# Patient Record
(demographics unavailable — no encounter records)

---

## 2024-12-27 NOTE — ASSESSMENT
[FreeTextEntry1] : Ms. VILLANUEVA is a 68-year-old female status post left craniotomy for resection of high-grade glioma with 5-ALA on 12/13/2024 presenting today for a post-operative visit.  Neurologically doing well, neurologically intact.  Incision well-healed.   Pending finalization of pathology.  Radiation/Oncology consultation today, 12/27/2024 with Dr. Gonzalez.   Patient will then follow-up with medical oncologist, Dr. Laguerre in the upcoming week and will go for second opinion at [facility] as well.  Repeat MRI Brain w/wo gado in 3 months.    Nguyen Ordaz MS FNP-BC Nurse Practitioner [facility]  Elias Eldridge MD  Department of Neurosurgery [facility] / [facility]

## 2024-12-27 NOTE — REASON FOR VISIT
[Family Member] : family member [de-identified] : Left craniotomy for resection tumor with 5-ALA  [de-identified] : 12/13/24

## 2024-12-27 NOTE — HISTORY OF PRESENT ILLNESS
[FreeTextEntry1] : Ms. VILLANUEVA is a 68-year-old female status post left craniotomy for resection of high-grade glioma with 5-ALA on 12/13/2024 presenting today for a post-operative visit.   INCISION: Well-healed. Sutures removed. No visible drainage, erythema, warmth, or edema. No signs or symptoms of infection noted.  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 Final classification pending results of special studies, including additional immunohistochemistry, molecular studies (NGS panel) and MGMT promoter methylation assay.    Physical Exam: Constitutional: Well appearing, no distress HEENT: Normocephalic Atraumatic Psychiatric: Awake, alert, oriented to person, place, situation and time. Normal affect. Follows commands. Language: Speech is clear, fluent with good repetition & comprehension. No dysarthria. Pulmonary: No respiratory distress  Neurologic: CN II-XII intact; EOMI with no nystagmus. No facial asymmetry bilaterally, full eye closure strength bilaterally. Hearing grossly normal. Head turning & shoulder shrug intact, bilaterally. Tongue midline, normal movements, no atrophy. Smile symmetrical. Palpation: No pain to palpation Strength: Full strength in all major muscle groups Sensation: Full sensation to light touch in all extremities, V1-V3 sensation bilaterally intact. Motor: No pronator drift, muscle strength of bilateral UE and bilateral LE: 5/5. Normal muscle tone. ROM intact Gait: No postural instability. Normal stance and walking without assistance.

## 2025-02-07 NOTE — HISTORY OF PRESENT ILLNESS
[FreeTextEntry1] :  This telephonic visit was provided via audio only technology. The patient, Cristina Dan, was located at home at the time of the visit. The provider, Elias Eldridge, was located at the medical office located in [address] [place] [address], [address] [place] [address] at the time of the visit. The patient, Cristina Dan and Provider participated in the telephonic visit. Verbal consent for telephonic services was given on 2/7/25 by the patient, Cristina Dan.  Overall Ms. Dan is doing excellent.  She reports no new headaches, nausea, vomiting, chest pain, shortness of breath, new weakness, numbness, tingling radiating down her arms or legs.  She has establish care with [facility] for her chemo and radiation with Dr. Yanez as her neuro oncologist and Dr. Ramos as her radiation oncologist.  She is on the third of 6 weeks of the Stupp protocol and is considering Optune Tumor Treating Fields therapy after her chemoradiation is completed.  She will follow-up with me in 2 months time with repeat brain MRI with and without contrast or sooner should she develop any new signs or symptoms of neurologic dysfunction.  All the patient's questions were answered and she was in full agreement with the plan above.  Thank you for allowing me to participate in your care.  Sincerely,  Elias Eldridge MD  Department of Neurosurgery [facility]

## 2025-02-07 NOTE — ASSESSMENT
[FreeTextEntry1] : Ms. VILLANUEVA is a very pleasant 68-year-old female status post left craniotomy for resection of high-grade glioma with 5-ALA on 12/13/2024 presenting today for telephonic post-operative visit.  Overall the patient is doing excellent and is on 3 out of 6 weeks of her subsequent chemoradiation and considering Optune Tumor Treating Fields thereafter.  She is getting treated by Lilian Yanez and Rachel for [facility] and will follow-up with me in 2 months time with repeat brain MRI with and without contrast or sooner should she develop any new signs or symptoms of neurologic dysfunction.  All the patient questions were answered and she was in full agreement the plan above.  Thank you for allowing me to participate in your care.  Sincerely,  Elias Eldridge MD  Department of Neurosurgery [facility]

## 2025-04-11 NOTE — END OF VISIT
[Time Spent: ___ minutes] : I have spent [unfilled] minutes of time on the encounter which excludes teaching and separately reported services. [FreeTextEntry3] : I have independently evaluated and examined this patient, have reviewed available imaging and have supervised and agree with the Advanced Clinical Practice provider and their history and physical and assessment and plan above.  All questions answered.  Thank you for allowing us to participate in your care.

## 2025-04-11 NOTE — ASSESSMENT
[FreeTextEntry1] : Ms. VILLANUEVA is a 69yo F status post left craniotomy for resection of high-grade glioma on 12/13/24 presenting for routine follow-up. Overall, she is doing excellent. She reports no new headaches, nausea, vomiting, chest pain, shortness of breath, new weakness, numbness, tingling radiating down her arms or legs. She has care established with [facility] for her chemo and radiation with Dr. Yanez as her neuro oncologist and Dr. Ramos as her radiation oncologist. MRI Brain w/wo IV contrast ([facility]) reviewed, postop changes, stable. No new intracranial enhancement. No acute infarct. No acute hemorrhage.  We did discuss Tumor Treating Fields and the patient will continue to consider this therapy but is not interested in proceeding at this time.    She will RTC in 4 months after her routine surveillance scan at [facility] and will bring the images with her, or can return sooner at any time if needed. Advised to call office barring any questions or concerns in the interim.    Nguyen Ordaz MS FNP-BC Nurse Practitioner [facility]  Elias Eldridge MD  Department of Neurosurgery [facility]

## 2025-04-11 NOTE — HISTORY OF PRESENT ILLNESS
[FreeTextEntry1] : Ms. VILLANUEVA is a 67yo F status post left craniotomy for resection of high-grade glioma on 12/13/24 presenting for routine follow-up.  Overall, she is doing excellent. She reports no new headaches, nausea, vomiting, chest pain, shortness of breath, new weakness, numbness, tingling radiating down her arms or legs. She has care established with [facility] for her chemo and radiation with Dr. Yanez as her neuro oncologist and Dr. Ramos as her radiation oncologist.  She is status post Stupp and currently on maintenance Temodar.  She has a history of seizures and continues on Keppra 1000 mg every 12 hours as managed by her neuro oncologist.  No recent seizure activity.  Currently not on steroids.  INCISION: Well-healed.   MRI Brain w/wo IV contrast ([facility]) reviewed, postop changes, stable. No new intracranial enhancement. No acute infarct. No acute hemorrhage.    Physical Exam: Constitutional: Well appearing, no distress HEENT: Normocephalic Atraumatic Psychiatric: Awake, alert, oriented to person, place, situation and time. Normal affect. Follows commands. Language: Speech is clear, fluent with good repetition & comprehension. No dysarthria. Pulmonary: No respiratory distress  Neurologic: CN II-XII grossly intact; EOMI with no nystagmus. No facial asymmetry bilaterally, full eye closure strength bilaterally. Hearing grossly normal. Head turning & shoulder shrug intact, bilaterally. Tongue midline, normal movements, no atrophy. Smile symmetrical. Palpation: No pain to palpation Strength: Full strength in all major muscle groups Sensation: Full sensation to light touch in all extremities, V1-V3 sensation bilaterally intact. Motor: No pronator drift, muscle strength of bilateral UE and bilateral LE: 5/5. Normal muscle tone. Reflexes: DTR of biceps, knee normal 2+ biceps 2+ patellar No Clonus No Wells's  ROM intact Straight-Leg Raise Test Left: Negative Straight-Leg Raise Test Right: Negative Gait: No postural instability. Normal stance and walking without assistance.